# Patient Record
Sex: FEMALE | Race: WHITE
[De-identification: names, ages, dates, MRNs, and addresses within clinical notes are randomized per-mention and may not be internally consistent; named-entity substitution may affect disease eponyms.]

---

## 2019-10-24 ENCOUNTER — APPOINTMENT (OUTPATIENT)
Dept: UROLOGY | Facility: CLINIC | Age: 84
End: 2019-10-24
Payer: MEDICARE

## 2019-10-24 DIAGNOSIS — R39.15 URGENCY OF URINATION: ICD-10-CM

## 2019-10-24 PROCEDURE — 51798 US URINE CAPACITY MEASURE: CPT

## 2019-10-24 PROCEDURE — 99203 OFFICE O/P NEW LOW 30 MIN: CPT | Mod: 25

## 2019-10-24 NOTE — PHYSICAL EXAM
[General Appearance - Well Developed] : well developed [Normal Appearance] : normal appearance [General Appearance - Well Nourished] : well nourished [Well Groomed] : well groomed [General Appearance - In No Acute Distress] : no acute distress [Abdomen Soft] : soft [Costovertebral Angle Tenderness] : no ~M costovertebral angle tenderness [Abdomen Tenderness] : non-tender [Urinary Bladder Findings] : the bladder was normal on palpation [FreeTextEntry1] : 29 mL PVR [] : no rash [Exaggerated Use Of Accessory Muscles For Inspiration] : no accessory muscle use [Respiration, Rhythm And Depth] : normal respiratory rhythm and effort [No Focal Deficits] : no focal deficits [Sensation] : the sensory exam was normal to light touch and pinprick

## 2019-10-24 NOTE — ASSESSMENT
[FreeTextEntry1] : 89 yo female with urinary urgency and incontinence for the past few months.  PVR 29 mL today. \par \par \par Renal/bladder US ordered\par Bladder diary. \par Discussed behavior modification.

## 2019-10-24 NOTE — HISTORY OF PRESENT ILLNESS
[FreeTextEntry1] : Miss Toure is a 89 yo female that presents for evaluation of urinary urgency with some incontinence. She is urinating 10 times during the day and 4 time at night.  She is a Lasix 20 mg daily.  Denies dysuria, hematuria, fever, chills and flank pain. She is using 4 pads per day for incontinence.

## 2019-12-12 ENCOUNTER — APPOINTMENT (OUTPATIENT)
Dept: UROLOGY | Facility: CLINIC | Age: 84
End: 2019-12-12
Payer: MEDICARE

## 2019-12-12 DIAGNOSIS — N39.41 URGE INCONTINENCE: ICD-10-CM

## 2019-12-12 PROCEDURE — 99213 OFFICE O/P EST LOW 20 MIN: CPT

## 2019-12-12 NOTE — HISTORY OF PRESENT ILLNESS
[Urinary Incontinence] : urinary incontinence [Urinary Urgency] : urinary urgency [Urinary Frequency] : urinary frequency [None] : None [FreeTextEntry1] : Had RBUS and kept diaries.  Voiding same

## 2019-12-12 NOTE — PHYSICAL EXAM
[General Appearance - Well Developed] : well developed [General Appearance - In No Acute Distress] : no acute distress [General Appearance - Well Nourished] : well nourished [Abdomen Soft] : soft [Abdomen Tenderness] : non-tender [Costovertebral Angle Tenderness] : no ~M costovertebral angle tenderness [Oriented To Time, Place, And Person] : oriented to person, place, and time

## 2019-12-12 NOTE — ASSESSMENT
[FreeTextEntry1] : Ouside ultrasound reviewed, shows no hydronephrosis, [id number].  Advised crede and timed voiding.  F/u in 3 mos unless worsened sx.